# Patient Record
Sex: FEMALE | Race: WHITE
[De-identification: names, ages, dates, MRNs, and addresses within clinical notes are randomized per-mention and may not be internally consistent; named-entity substitution may affect disease eponyms.]

---

## 2017-10-20 ENCOUNTER — HOSPITAL ENCOUNTER (OUTPATIENT)
Dept: HOSPITAL 47 - RADMAMWWP | Age: 62
Discharge: HOME | End: 2017-10-20
Payer: COMMERCIAL

## 2017-10-20 DIAGNOSIS — Z12.31: Primary | ICD-10-CM

## 2017-10-23 NOTE — MM
Reason for exam: screening  (asymptomatic).

Last mammogram was performed 1 year and 1 month ago.



History:

Patient is postmenopausal and is nulliparous.

Family history of breast cancer in maternal aunt.

Benign cyst aspiration of the right breast, 2003.



Physical Findings:

A clinical breast exam by your physician is recommended on an annual basis and 

results should be correlated with mammographic findings.



MG Screening Mammo w CAD

Bilateral CC and MLO view(s) were taken.

Prior study comparison: September 29, 2016, bilateral MG screening mammo w CAD.  

November 12, 2014, bilateral MG screening mammo w CAD.

The breast tissue is extremely dense which could obscure a lesion on mammography. 

No suspicious abnormality on the right breast. Architectural distortion lower 

inner quadrant at middle depth.



These results were verbally communicated with the patient and result sheet given 

to the patient on 10/20/17.





ASSESSMENT: Incomplete: need additional imaging evaluation, BI-RAD 0



RECOMMENDATION:

Special view mammogram and ultrasound of the left breast.



[facility] will attempt to contact patient to return for supplemental 

views and ultrasound.

## 2017-12-15 ENCOUNTER — HOSPITAL ENCOUNTER (OUTPATIENT)
Dept: HOSPITAL 47 - LABPAT | Age: 62
Discharge: HOME | End: 2017-12-15
Payer: COMMERCIAL

## 2017-12-15 DIAGNOSIS — M23.92: ICD-10-CM

## 2017-12-15 DIAGNOSIS — Z01.812: Primary | ICD-10-CM

## 2017-12-15 LAB
ANION GAP SERPL CALC-SCNC: 5 MMOL/L
BASOPHILS # BLD AUTO: 0.1 K/UL (ref 0–0.2)
BASOPHILS NFR BLD AUTO: 1 %
CH: 30.1
CHCM: 31.6
CHLORIDE SERPL-SCNC: 104 MMOL/L (ref 98–107)
CO2 SERPL-SCNC: 31 MMOL/L (ref 22–30)
EOSINOPHIL # BLD AUTO: 0.2 K/UL (ref 0–0.7)
EOSINOPHIL NFR BLD AUTO: 2 %
ERYTHROCYTE [DISTWIDTH] IN BLOOD BY AUTOMATED COUNT: 4.76 M/UL (ref 3.8–5.4)
ERYTHROCYTE [DISTWIDTH] IN BLOOD: 14.2 % (ref 11.5–15.5)
HCT VFR BLD AUTO: 45.6 % (ref 34–46)
HDW: 2.28
HGB BLD-MCNC: 14.3 GM/DL (ref 11.4–16)
LUC NFR BLD AUTO: 1 %
LYMPHOCYTES # SPEC AUTO: 1.9 K/UL (ref 1–4.8)
LYMPHOCYTES NFR SPEC AUTO: 28 %
MCH RBC QN AUTO: 30 PG (ref 25–35)
MCHC RBC AUTO-ENTMCNC: 31.3 G/DL (ref 31–37)
MCV RBC AUTO: 95.8 FL (ref 80–100)
MONOCYTES # BLD AUTO: 0.6 K/UL (ref 0–1)
MONOCYTES NFR BLD AUTO: 8 %
NEUTROPHILS # BLD AUTO: 4.2 K/UL (ref 1.3–7.7)
NEUTROPHILS NFR BLD AUTO: 61 %
POTASSIUM SERPL-SCNC: 5.3 MMOL/L (ref 3.5–5.1)
SODIUM SERPL-SCNC: 140 MMOL/L (ref 137–145)
WBC # BLD AUTO: 0.08 10*3/UL
WBC # BLD AUTO: 7 K/UL (ref 3.8–10.6)
WBC (PEROX): 7.07

## 2017-12-15 PROCEDURE — 93005 ELECTROCARDIOGRAM TRACING: CPT

## 2017-12-15 PROCEDURE — 36415 COLL VENOUS BLD VENIPUNCTURE: CPT

## 2017-12-15 PROCEDURE — 85025 COMPLETE CBC W/AUTO DIFF WBC: CPT

## 2017-12-15 PROCEDURE — 80051 ELECTROLYTE PANEL: CPT

## 2017-12-26 NOTE — HP
HISTORY AND PHYSICAL



CHIEF COMPLAINT:

Left knee pain.



HISTORY OF PRESENT ILLNESS:

The patient is a 62-year-old  who presents with progressive left knee

pain and mechanical symptoms after previous twisting injury.  She has tried medications

in addition to an injection with continued limitation of her activities because of

pain.  She has been unable to work recently.



PAST MEDICAL HISTORY:

Significant for hypothyroidism and osteoporosis along with depression.



CURRENT MEDICATIONS:

Adderall, Fosamax, Protonix, Synthroid, Wellbutrin, and Zyrtec.



PAST SURGICAL HISTORY:

Significant for previous left knee arthroscopy.



FAMILY HISTORY:

Significant for cancer.



SOCIAL HISTORY:

Significant for social alcohol use.



REVIEW OF SYSTEMS:

Sixteen point review of systems otherwise reviewed and is noncontributory.



PHYSICAL EXAMINATION:

On examination, the patient is approximately 5 foot 5, 140 pounds, of mesomorphic

habitus.  HEENT exam is nonfocal.  NECK:  Supple. She has painless passive motion of

her left hip.  Straight leg raise is negative.  Active motion left knee -10 to 125

degrees of flexion.  She has a mild effusion.  She is tender about the medial and

lateral joint line.  Active motion -10 to 125.  Collaterals are stable, Lachman's

negative, Chary's elicits medial and lateral pain.  Her distal neurovascular exam

appears to be intact in the left lower extremity.



MRI report for the left knee from 12/11/2017 shows a posterior medial meniscal tear

along with an anterior lateral meniscal tear.



IMPRESSION:

Left knee internal derangement with symptomatic medial lateral meniscal tears.



RECOMMENDATIONS:

I talked to the patient at length regarding treatment options.  At this point, she is

having significant pain and mechanical symptoms despite conservative treatment.  After

thorough discussion, she opts to proceed with surgery.  We will plan to proceed with

arthroscopic evaluation with possible partial medial and lateral meniscectomy.  We will

likely perform that as an outpatient procedure.





MMODL / IJN: [id number] / Job#: [id number]

## 2017-12-27 ENCOUNTER — HOSPITAL ENCOUNTER (OUTPATIENT)
Dept: HOSPITAL 47 - OR | Age: 62
Discharge: HOME | End: 2017-12-27
Payer: COMMERCIAL

## 2017-12-27 VITALS — TEMPERATURE: 97.7 F

## 2017-12-27 VITALS — DIASTOLIC BLOOD PRESSURE: 83 MMHG | HEART RATE: 60 BPM | SYSTOLIC BLOOD PRESSURE: 157 MMHG

## 2017-12-27 VITALS — RESPIRATION RATE: 18 BRPM

## 2017-12-27 VITALS — BODY MASS INDEX: 22.9 KG/M2

## 2017-12-27 DIAGNOSIS — S83.242A: Primary | ICD-10-CM

## 2017-12-27 DIAGNOSIS — M17.12: ICD-10-CM

## 2017-12-27 DIAGNOSIS — F32.9: ICD-10-CM

## 2017-12-27 DIAGNOSIS — M81.0: ICD-10-CM

## 2017-12-27 DIAGNOSIS — S83.272A: ICD-10-CM

## 2017-12-27 DIAGNOSIS — E03.9: ICD-10-CM

## 2017-12-27 DIAGNOSIS — Z79.1: ICD-10-CM
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
## 2017-12-27 DIAGNOSIS — M65.862: ICD-10-CM

## 2017-12-27 DIAGNOSIS — Z79.899: ICD-10-CM

## 2017-12-27 PROCEDURE — 29880 ARTHRS KNE SRG MNISECTMY M&L: CPT

## 2017-12-27 PROCEDURE — 84132 ASSAY OF SERUM POTASSIUM: CPT

## 2017-12-27 RX ADMIN — HYDROMORPHONE HYDROCHLORIDE PRN MG: 1 INJECTION, SOLUTION INTRAMUSCULAR; INTRAVENOUS; SUBCUTANEOUS at 13:50

## 2017-12-27 RX ADMIN — HYDROMORPHONE HYDROCHLORIDE PRN MG: 1 INJECTION, SOLUTION INTRAMUSCULAR; INTRAVENOUS; SUBCUTANEOUS at 13:57

## 2017-12-27 NOTE — P.OP
Date of Procedure: 12/27/17


Preoperative Diagnosis: 


Left knee internal derangement


Postoperative Diagnosis: 


Left knee posterior medial meniscal tear/grade 3 chondral injury medial femoral 

condyle/anterior lateral meniscal tear/reactive synovitis


Procedure(s) Performed: 


Left knee arthroscopic partial medial meniscectomy/medial femoral chondrectomy/

partial lateral meniscectomy/partial synovectomy of the medial and lateral 

compartments


Anesthesia: GWEN


Surgeon: Julián Meeks


Estimated Blood Loss (ml): 10


Pathology: none sent


Condition: stable


Disposition: PACU


Indications for Procedure: 


The patient's a 62-year-old female who presents with progressive left knee pain 

and mechanical symptoms after a previous twisting injury.  She tried 

conservative measures with persistence of her symptoms.  A discussion of the 

risks and benefits of continued conservative measures versus operative 

intervention was made with patient.  She opted to proceed with surgery.  

Operative risks to include infection, neurovascular injury, development of 

blood clots, possible incomplete resolution of symptoms, possible worsening of 

symptoms and need for subsequent procedures was discussed.  Informed consent 

was obtained.


Operative Findings: 


As below


Description of Procedure: 


The patient was brought to the operating room, and after induction of general 

anesthesia examined left knee.  Collaterals were stable, Lachman was negative, 

and posterior drawer was negative.  The left lower extremity was prepped and 

draped in a normal fashion.  A superior lateral portal was made through a 3 mm 

skin incision superior and lateral to the patella.  This was used for outflow.  

A lateral portal was made through a 5 mm vertical skin incision lateral to the 

patellar tendon above the joint line.  Diagnostic arthroscopy was performed.  A 

medial portal was made through a similar incision medial to the patellar tendon 

above the joint line.  On inspection of the medial compartment she is noted to 

have a longitudinal tear involving the posterior horn of the medial meniscus in 

the white-white junction.  This debrided back to stable base with straight 

baskets and a motorized shaver.  The edges were contoured.  A corresponding 

grade 3 chondral injury was noted involving the distal medial portion of the 

medial femoral condyle.  There was a loose chondral flap that was debrided back 

to stable base with a motorized shaver.  Reactive synovitis involving the 

anterior medial and lateral compartments was debrided with a motorized shaver.  

On inspection of the notch, the anterior cruciate ligament appeared to be 

intact.  On inspection the lateral compartment, a complex tear involving the 

anterior horn of the lateral meniscus in the white-junction was noted.  This 

was debrided back to stable base with a motorized shaver.  A degenerative type 

tear was noted involving the middle one third of the lateral meniscus in the 

white-junction.  This was debrided back to stable base with straight baskets 

and a motorized shaver.  The posterior horn was intact.  On inspection 

patellofemoral articular lesion, minimal degenerative changes were noted.  The 

gutters were clear debris.  The knee was then thoroughly irrigated.  The 

portals were closed with Steri-Strips.  A sterile dressing was applied in 

addition to a compression stocking.  The patient was awoken from general 

anesthesia and transferred to recovery room in good condition.  Blood loss was 

estimated at 10 mL.  No complications were incurred.

## 2018-03-30 ENCOUNTER — HOSPITAL ENCOUNTER (OUTPATIENT)
Dept: HOSPITAL 47 - RADBDWWP | Age: 63
Discharge: HOME | End: 2018-03-30
Payer: COMMERCIAL

## 2018-03-30 DIAGNOSIS — Z13.820: Primary | ICD-10-CM

## 2018-03-30 PROCEDURE — 77080 DXA BONE DENSITY AXIAL: CPT

## 2018-03-30 NOTE — BD
EXAMINATION TYPE: MG DEXA axial skeleton.  

 

DATE OF EXAM: 3/30/2018

 

COMPARISON: NONE

 

CLINICAL HISTORY:

 

Height:  64

Weight:  141.9

 

FRAX RISK QUESTIONS:

Alcohol (3 or more units per day):  no

Family History (Parent hip fracture):  no

Glucocorticoids (More than 3mos):  no

           (Ex: prednisone, prednisolone, methylprednisolone, dexamethasone, and hydrocortisone).    
     

History of Fracture in Adulthood: yes

Secondary Osteoporosis:

  1.  Type 1 Diabetes: no

  2.  Hyperthyroidism: no

  3.  Menopause before 45: no

  4.  Malnutrition: no

  5.  Chronic liver disease: no

Rheumatoid Arthritis: no

Current Tobacco Use: no

 

RISK FACTORS 

HISTORY OF: 

Family History of Osteoporosis: no

Active: yes

Diet low in dairy products/other sources of calcium:  no

Postmenopausal woman: around age 57

Lost more than 2 inches in height since high school: no

Frequent falls: no

Adrenal Insufficiency: no

 

MEDICATIONS: wellbutrin, aderol, motrin, allergy meds. protonix

th yroid Medications:  levothyroxine, 

How Long: 10 years

Osteoporosis Medications: fosamax

How Lon years

 

 

 

Additional History:

 

 

EXAM MEASUREMENTS: 

Bone mineral densitometry was performed using the [employer] System.

Bone mineral density as measured about the Lumbar spine is:  

----- L1-L4(G/cm2): 1.092

T Score Values are as follows:

----- L2: -0.5

----- L3: -0.5

----- L4: -0.7

----- L1-L4: -0.7

Bone mineral density has: increased 4.8 % since study of: 2016

 

Bone mineral density about the R hip (g/cm2): 0.840

Bone mineral density about the L hip (g/cm2): 0.796

T Score values are as follows:

-----R Neck: -1.6

-----L Neck: -1.7

-----R Total: -0.6

-----L Total: -1.0

Bone mineral density has: % since study of: 2016

 

 

IMPRESSION:

no osteoporosis or osteopenia

 

 

 

 

 

NOTE:  T-SCORE=SD OF THE YOUNG ADULT MEAN.

## 2019-04-18 ENCOUNTER — HOSPITAL ENCOUNTER (OUTPATIENT)
Dept: HOSPITAL 47 - RADMAMWWP | Age: 64
End: 2019-04-18
Attending: FAMILY MEDICINE
Payer: COMMERCIAL

## 2019-04-18 DIAGNOSIS — Z12.31: Primary | ICD-10-CM

## 2019-04-18 PROCEDURE — 77067 SCR MAMMO BI INCL CAD: CPT

## 2019-04-18 PROCEDURE — 77063 BREAST TOMOSYNTHESIS BI: CPT

## 2019-04-19 NOTE — MM
Reason for exam: screening  (asymptomatic).

Last mammogram was performed 1 year and 6 months ago.



History:

Patient is postmenopausal and is nulliparous.

Family history of breast cancer in maternal aunt.

US discontinued breast core LT of the left breast, November 3, 2017.  Benign cyst 

aspiration of the right breast, 2003.



Physical Findings:

A clinical breast exam by your physician is recommended on an annual basis and 

results should be correlated with mammographic findings.



MG 3D Screening Mammo W/Cad

Bilateral CC and MLO view(s) were taken.

Prior study comparison: October 26, 2017, left breast MG work up mamm w CAD LT.  

October 20, 2017, bilateral MG screening mammo w CAD.

The breast tissue is extremely dense which could obscure a lesion on mammography. 

Benign appearing bilateral calcifications. No suspicious abnormality.  No 

significant changes when compared with prior studies.





ASSESSMENT: Benign, BI-RAD 2



RECOMMENDATION:

Routine screening mammogram of both breasts in 1 year.

## 2020-11-17 ENCOUNTER — HOSPITAL ENCOUNTER (OUTPATIENT)
Dept: HOSPITAL 47 - RADMAMWWP | Age: 65
Discharge: HOME | End: 2020-11-17
Attending: OBSTETRICS & GYNECOLOGY
Payer: COMMERCIAL

## 2020-11-17 DIAGNOSIS — Z12.31: Primary | ICD-10-CM

## 2020-11-17 PROCEDURE — 77067 SCR MAMMO BI INCL CAD: CPT

## 2020-11-18 NOTE — MM
Reason for exam: screening  (asymptomatic).

Last mammogram was performed 1 year and 7 months ago.



History:

Patient is postmenopausal and is nulliparous.

Family history of breast cancer in maternal aunt.

US discontinued breast core LT of the left breast, November 3, 2017.  Benign cyst 

aspiration of the right breast, 2003.



Physical Findings:

A clinical breast exam by your physician is recommended on an annual basis and 

results should be correlated with mammographic findings.



MG Screening Mammo w CAD

Bilateral CC and MLO view(s) were taken.

Prior study comparison: April 18, 2019, bilateral MG 3d screening mammo w/cad.  

October 26, 2017, left breast MG work up mamm w CAD LT.

The breast tissue is extremely dense which could obscure a lesion on mammography. 

Stable benign calcifications. There is no discrete abnormality.  No significant 

changes when compared with prior studies.





ASSESSMENT: Benign, BI-RAD 2



RECOMMENDATION:

Routine screening mammogram of both breasts in 1 year.

## 2021-12-22 ENCOUNTER — HOSPITAL ENCOUNTER (OUTPATIENT)
Dept: HOSPITAL 47 - RADMAMWWP | Age: 66
Discharge: HOME | End: 2021-12-22
Attending: FAMILY MEDICINE
Payer: MEDICARE

## 2021-12-22 DIAGNOSIS — Z78.0: ICD-10-CM

## 2021-12-22 DIAGNOSIS — N60.42: ICD-10-CM

## 2021-12-22 DIAGNOSIS — Z80.3: ICD-10-CM

## 2021-12-22 DIAGNOSIS — N64.89: Primary | ICD-10-CM

## 2021-12-22 DIAGNOSIS — N60.02: ICD-10-CM

## 2021-12-22 PROCEDURE — 76641 ULTRASOUND BREAST COMPLETE: CPT

## 2021-12-22 PROCEDURE — 77062 BREAST TOMOSYNTHESIS BI: CPT

## 2021-12-22 PROCEDURE — 77066 DX MAMMO INCL CAD BI: CPT

## 2021-12-22 NOTE — MM
Reason for exam: clinical finding.

Last mammogram was performed 1 year and 1 month ago.



History:

Patient is postmenopausal and is nulliparous.

Family history of breast cancer in maternal aunt.

US discontinued breast core LT of the left breast, November 3, 2017.  Benign cyst 

aspiration of the right breast, 2003.



Physical Findings:

Nurse did not find any significant physical abnormalities on exam.



MG 3D Diag Mammo W/Cad ADAM

Bilateral CC, MLO, and XCCL view(s) were taken.

Prior study comparison: November 17, 2020, bilateral MG screening mammo w CAD.  

April 18, 2019, bilateral MG 3d screening mammo w/cad.

The breast tissue is heterogeneously dense. This may lower the sensitivity of 

mammography.  Benign oil cyst calcifications. Left medial asymmetric density does 

not persist on XCCL view. Stable asymmetric density posterior superior right 

breast.



These results were verbally communicated with the patient and result sheet given 

to the patient on 12/22/21.





ASSESSMENT: Incomplete: need additional imaging evaluation, BI-RAD 0



RECOMMENDATION:

Ultrasound of the left breast.

(for pain)

## 2021-12-22 NOTE — USB
Reason for exam: additional evaluation requested from abnormal screening.



History:

Patient is postmenopausal and is nulliparous.

Family history of breast cancer in maternal aunt.

US discontinued breast core LT of the left breast, November 3, 2017.  Benign cyst 

aspiration of the right breast, 2003.



US Breast LT

Left complete breast ultrasound includes all four quadrants, the retroareolar 

region and axilla. Finding demonstrates a 0.3 x 0.2 x 0.4cm cystic, benign lesion 

at 3 o'clock, mild duct ectasia.



These results were verbally communicated with the patient and result sheet given 

to the patient on 12/22/21.





ASSESSMENT: Benign, BI-RAD 2



RECOMMENDATION:

Routine screening mammogram of both breasts in 1 year.

Manage on a clinical basis with regard to left pain.

## 2022-02-09 ENCOUNTER — HOSPITAL ENCOUNTER (OUTPATIENT)
Dept: HOSPITAL 47 - RADBDWWP | Age: 67
Discharge: HOME | End: 2022-02-09
Attending: FAMILY MEDICINE
Payer: MEDICARE

## 2022-02-09 DIAGNOSIS — M85.88: Primary | ICD-10-CM

## 2022-02-09 PROCEDURE — 77080 DXA BONE DENSITY AXIAL: CPT

## 2022-02-09 NOTE — BD
EXAMINATION TYPE: Axial Bone Density

 

DATE OF EXAM: 2022

 

COMPARISON: NONE

 

CLINICAL HISTORY:

 

Height:  64

Weight:  138.9

 

FRAX RISK QUESTIONS:

Alcohol (3 or more units per day):  no

Family History (Parent hip fracture):  no

Glucocorticoids (More than 3mos):  no

           (Ex: prednisone, prednisolone, methylprednisolone, dexamethasone, and hydrocortisone).    
     

History of Fracture in Adulthood: yes

Secondary Osteoporosis:

  1.  Type 1 Diabetes: no

  2.  Hyperthyroidism: no

  3.  Menopause before 45: no

  4.  Malnutrition: no

  5.  Chronic liver disease: no

Rheumatoid Arthritis: no

Current Tobacco Use: no

 

RISK FACTORS 

HISTORY OF: 

History of Wrist Fracture: right

When: 10 years

Surgery to Spine/Hip(right/left)/Wrist (right/left): rt wrist

When: 10 years ago

Family History of Osteoporosis: no

Active: no

Diet low in dairy products/other sources of calcium:  yes

Postmenopausal woman: yes

Lost more than 2 inches in height since high school: yes

 

MEDICATIONS: protonix, anti-depressant, blood pressure

Thyroid Medications:  levothyroxine

How Lon years

 

 

Additional History:

 

 

EXAM MEASUREMENTS: 

Bone mineral densitometry was performed using the [employer].

Bone mineral density as measured about the Lumbar spine is:  

----- L1-L4(G/cm2): 1.151

T Score Values are as follows:

----- L2: -0.2

----- L3: -0.8

----- L4: -0.8

----- L1-L4: -0.2

Bone mineral density has: decreased -0.2 % since study of: 3.30.2018

 

Bone mineral density about the R hip (g/cm2): 0.822

Bone mineral density about the L hip (g/cm2): 0.737

T Score values are as follows:

-----R Neck: -1.6

-----L Neck: -2.2

-----R Total: -1.0

-----L Total: -1.7

Bone mineral density has: decreased -7.2 % since study of: 3.30.2018

 

 

IMPRESSION:

Osteopenia of the bilateral femora.

 

NOTE:  T-SCORE=SD OF THE YOUNG ADULT MEAN.

## 2022-03-04 ENCOUNTER — HOSPITAL ENCOUNTER (OUTPATIENT)
Dept: HOSPITAL 47 - ORWHC2ENDO | Age: 67
Discharge: HOME | End: 2022-03-04
Attending: INTERNAL MEDICINE
Payer: MEDICARE

## 2022-03-04 VITALS — TEMPERATURE: 98.2 F

## 2022-03-04 VITALS — HEART RATE: 70 BPM | SYSTOLIC BLOOD PRESSURE: 120 MMHG | RESPIRATION RATE: 20 BRPM | DIASTOLIC BLOOD PRESSURE: 70 MMHG

## 2022-03-04 DIAGNOSIS — K57.30: Primary | ICD-10-CM

## 2022-03-04 DIAGNOSIS — K21.9: ICD-10-CM

## 2022-03-04 DIAGNOSIS — I10: ICD-10-CM

## 2022-03-04 DIAGNOSIS — K64.8: ICD-10-CM

## 2022-03-04 DIAGNOSIS — E07.9: ICD-10-CM

## 2022-03-04 PROCEDURE — 45378 DIAGNOSTIC COLONOSCOPY: CPT

## 2022-03-04 NOTE — P.PCN
Date of Procedure: 03/04/22


Procedure(s) Performed: 


BRIEF HISTORY: Patient is a 67-year-old pleasant female scheduled for an 

elective colonoscopy as a part of evaluation of change in bowel habits.





PROCEDURE PERFORMED: Colonoscopy. 





PREOPERATIVE DIAGNOSIS: Change in bowel habits. 





IV sedation per Anesthesia. 





PROCEDURE: After informed consent was obtained, the patient, was brought into 

the endoscopy unit. IV sedation was administered by Anesthesia under continuous 

monitoring.  Digital rectal examination was normal. Initially the Olympus CF-160

flexible video colonoscope was then inserted in the rectum, gradually advanced 

into the cecum without any difficulty. Careful examination was performed as the 

scope was gradually being withdrawn. Ileocecal valve and the appendiceal orifice

were visualized and appeared normal.  Prep was excellent. Mucosa of the cecum, 

ascending colon, transverse colon, descending colon, sigmoid colon, and rectum 

appeared normal.  At her sigmoid antibiosis seen.  Retroflexion was performed in

the rectum and small internal hemorrhoids were seen. The patient tolerated the 

procedure well. 





IMPRESSION: 


Normal-appearing colon from rectum to cecum .  No evidence of colorectal 

neoplasia


Scattered sigmoid diverticulosis


Small internal hemorrhoids





RECOMMENDATIONS:  Findings of this examination were discussed with the patient 

is a family.  She was advised to have a repeat screening colonoscopy in 10 

years.

## 2022-04-19 ENCOUNTER — HOSPITAL ENCOUNTER (OUTPATIENT)
Dept: HOSPITAL 47 - WWCWWP | Age: 67
End: 2022-04-19
Attending: OBSTETRICS & GYNECOLOGY
Payer: MEDICARE

## 2022-04-19 VITALS
TEMPERATURE: 98.7 F | HEART RATE: 79 BPM | SYSTOLIC BLOOD PRESSURE: 167 MMHG | DIASTOLIC BLOOD PRESSURE: 94 MMHG | RESPIRATION RATE: 16 BRPM

## 2022-04-19 DIAGNOSIS — Z01.419: Primary | ICD-10-CM

## 2022-04-19 DIAGNOSIS — Z87.39: ICD-10-CM

## 2022-04-19 DIAGNOSIS — M19.90: ICD-10-CM

## 2022-04-19 DIAGNOSIS — F32.A: ICD-10-CM

## 2022-04-19 DIAGNOSIS — I10: ICD-10-CM

## 2022-04-19 DIAGNOSIS — Z79.890: ICD-10-CM

## 2022-04-19 DIAGNOSIS — E03.9: ICD-10-CM

## 2022-04-19 DIAGNOSIS — M25.552: ICD-10-CM

## 2022-04-19 DIAGNOSIS — K21.9: ICD-10-CM

## 2022-04-19 DIAGNOSIS — M79.605: ICD-10-CM

## 2022-04-19 DIAGNOSIS — Z79.899: ICD-10-CM

## 2022-04-19 NOTE — P.HPOB
History of Present Illness


H&P Date: 04/19/22


Chief Complaint: The patient is here for her routine gynecologic exam.





This is a 67-year-old G0 with an LMP of 2006.  The patient is here to establish 

with this office.  It has been about 2 years since her last pelvic exam.  She 

previously saw Dr. Nguyen for her gynecologic care. During the past year, she 

has been experiencing some left-sided pains ranging from her breast down to her 

left leg.  She has been experiencing some left pelvic discomfort that seems to 

affect her left hip and left upper leg.  She describes it as an achy discomfort.

 Because of the left-sided pains she has had a mammogram and left side workup 

which was benign in December 2021.  She also had a colonoscopy done which showed

diverticulosis per the patient.  She is otherwise without gynecologic 

complaints.





Review of Systems





She has gained about 10 pounds over the past year.  She denies respiratory or 

cardiac problems.  GI: She has had issues with constipation.





Past Medical History


Past Medical History: GERD/Reflux, Hypertension, Osteoarthritis (OA), Thyroid 

Disorder


Additional Past Medical History / Comment(s): Hypothyroidism and seasonal 

ALLERGIES.  History of osteopenia(may have taken Fosomax for uncertain amount of

time). PAST GYN HISTORY: She has no history of STDs.


History of Any Multi-Drug Resistant Organisms: MRSA


Date of last positivie culture/infection: STATES HAD BOIL THIGH OVER A YEAR AGO 

IT TESTED POITIVE FOR MRSA


MDRO Source:: THIGH.  2014


Past Surgical History: Orthopedic Surgery


Additional Past Surgical History / Comment(s): L KNEE ARTHROSCOPY,LEFT WRIST 

ORIF,BENIGN PITUITARY TUMOR REMOVED, VOCAL CHORD NODULES, RIGHT KNEE 

ARTHROSCOPIC, SINUS PROCEDURE


Past Anesthesia/Blood Transfusion Reactions: No Reported Reaction


Past Psychological History: ADD/ADHD, Depression (No current symptoms of de

pression on medication.)


Smoking Status: Never smoker


Past Alcohol Use History: Rare (0-3 per month)


Past Drug Use History: Marijuana (Infrequent use.)


Additional History: She is  and has not seen anybody at this time.  She 

is retired.





- Past Family History


  ** Mother


Family Medical History: Congestive Heart Failure (CHF)


Additional Family Medical History / Comment(s): Maternal aunt had breast cancer.





  ** Father


Family Medical History: Cancer


Additional Family Medical History / Comment(s): Liver cirrhosis.  Alcohol abuse.

 Lung cancer.





  ** Brother(s)


Additional Family Medical History / Comment(s): History of alcohol abuse.





Medications and Allergies


                                Home Medications











 Medication  Instructions  Recorded  Confirmed  Type


 


Ibuprofen [Motrin] 600 mg PO Q6H PRN 09/12/14 04/19/22 History


 


Levothyroxine Sodium [Synthroid] 75 mcg PO QAM 09/12/14 04/19/22 History


 


Pantoprazole Sodium 40 mg PO QAM 09/12/14 04/19/22 History


 


buPROPion HCL [Wellbutrin XL] 300 mg PO QAM 09/12/14 04/19/22 History


 


Montelukast [Singulair] 10 mg PO QAM 10/27/17 04/19/22 History


 


Lisinopril [Prinivil] 10 mg PO QAM 03/03/22 04/19/22 History








                                    Allergies











Allergy/AdvReac Type Severity Reaction Status Date / Time


 


No Known Allergies Allergy   Verified 04/19/22 08:23














Exam


                                   Vital Signs











  Temp Pulse Resp BP Pulse Ox


 


 04/19/22 08:26  98.7 F  79  16  167/94  99








                                Intake and Output











 04/18/22 04/19/22 04/19/22





 22:59 06:59 14:59


 


Other:   


 


  Weight   65.317 kg














Height 5 feet 4 inches, weight 144 pounds, BMI 24.7.





This is a well-developed well-nourished white female who is alert and oriented t

imes 3 in no acute distress.


HEENT: Within normal limits.


NECK: Supple without mass or thyromegaly.


CHEST AND LUNGS: Clear to auscultation.


HEART: Regular rate and rhythm.


BREASTS: Are without mass or discharge.


AXILLARY EXAM: Negative for adenopathy.


BACK: Negative for CVA tenderness.


ABDOMEN: Soft, nontender, without palpable masses.


PELVIC EXAM: Normal external genitalia with mild atrophy. Cervix and vagina 

appear normal with mild atrophy.  The cervix is somewhat stenotic secondary to 

atrophy.  There is no unusual discharge.  There is no evidence of prolapse.  The

 uterus is midposition, nongravid size and nontender. There are no palpable 

adnexal masses or tenderness.


RECTAL EXAM: Rectovaginal exam is negative for mass or tenderness and is 

negative for occult blood.


EXTREMITIES: Nontender.





IMPRESSION: 


1.  67-year-old menopausal female with normal gynecologic exam.


2.  Intermittent left sided pains with some symptoms involving the pelvic area, 

left hip and left leg.  No significant physical pelvic findings at this time.  I

 doubt the left-sided pain is gynecologic in nature.


3.  History of osteopenia.  Possible Fosamax use for an uncertain duration.


4.  Elevated blood pressure with a history of chronic hypertension.





PLAN: 


1.  Pap smear was performed.  I will also obtain records from Dr. Nguyen's 

office for the last 10 years of Pap smears.  After these are reviewed, we will 

consider discontinuing Pap smears.


2.  Self breast awareness was discussed with the patient.  We have also 

discussed symptoms associated with inflammatory breast cancer.


3.  Diagnostic mammogram with left breast ultrasound done on 12/22/2021 was 

benign.  Screening mammogram in 1 year was recommended.


4.  Pelvic ultrasound is recommended and the order slip was given to the patient

 for this.  If this is unremarkable, a non-gynecologic cause would be most 

likely for her left-sided pains.


5.  We have discussed her elevated blood pressure.  She states she has been 

taking some type of medication or supplement to help with low energy and for 

weight loss.  This may have an effect on her blood pressure have recommended 

that she discontinue using this.  I have recommended that she check her own blo

od pressure on a regular basis since she does have a blood pressure cuff.  I 

recommended that she follow up with Dr. Cordero for blood pressure elevations.


6.Osteoporosis prevention was discussed.  I have stressed the importance of 

adequate calcium, vitamin D and regular exercise.  Recommended amounts of 

calcium and vitamin D were also discussed.  She had a recent bone density test 

done on 02/09/2022 showed osteopenia.  We will plan on repeating this after 

about 2 years.


7.  She has not received a Covid vaccination and has not had Covid.  She 

understands the CDC recommends Covid vaccination.  She will consider this.


8. She was advised to return in one year for her annual well woman exam and as 

needed.

## 2022-05-03 ENCOUNTER — HOSPITAL ENCOUNTER (OUTPATIENT)
Dept: HOSPITAL 47 - RADUSWWP | Age: 67
Discharge: HOME | End: 2022-05-03
Attending: OBSTETRICS & GYNECOLOGY
Payer: MEDICARE

## 2022-05-03 DIAGNOSIS — R10.2: Primary | ICD-10-CM

## 2022-05-03 DIAGNOSIS — R14.3: ICD-10-CM

## 2022-05-03 PROCEDURE — 76830 TRANSVAGINAL US NON-OB: CPT

## 2022-05-03 PROCEDURE — 76856 US EXAM PELVIC COMPLETE: CPT

## 2022-05-04 NOTE — P.PN
Progress Note - Text


Progress Note Date: 05/04/22





OUTPATIENT FOLLOW-UP NOTE





TEST(S)/RESULTS: Pelvic ultrasound done on 05/03/2022 showed no evidence of 

pelvic or adnexal masses.  The endometrial thickness was normal at 0.2 cm.  A 

small amount of fluid was noted within the endometrial cavity.


METHOD OF NOTIFICATION: The patient was notified by phone.


PATIENT COMMENTS: The patient denies any postmenopausal bleeding.


DIAGNOSIS: Small endometrial fluid within the endometrial cavity with no 

evidence of endometrial thickening.  And no evidence of adnexal masses.


DISCUSSION: We have discussed how her left-sided body pain including hip and 

pelvic discomfort are probably not gynecologic in nature.  She can discuss other

types of workup with her primary care physician for the left-sided body pain.


PLAN: Repeat pelvic ultrasound in one year. She was advised to return in one 

year for her annual well woman exam.
negative history

## 2022-05-04 NOTE — US
EXAMINATION TYPE: US pelvis complete transvag

 

DATE OF EXAM: 5/3/2022

 

COMPARISON: NONE

 

CLINICAL HISTORY: R10.2 Pelvic pain. left pelvic pain

 

TECHNIQUE:  Transvaginal (TV) and Transabdominal (TA) .  Transabdominal sonographic images of the pel
vis were acquired.  Transvaginal sonographic images were medically necessary to better assess the fol
lowing anatomy: ovaries 

 

Date of LMP:  unknown 

 

EXAM MEASUREMENTS:

 

Uterus:  5.1 x 1.7 x 3.7 cm

Endometrial Stripe: 0.2 cm

Right Ovary:  unable to visualize 

Left Ovary:  unable to visualize 

 

 

 

1. Uterus:  Anteverted  

2. Endometrium:  fluid within

3. Right Ovary:  Obscured by overlying bowel gas

4. Left Ovary:  Obscured by overlying bowel gas

5. Bilateral Adnexa:  large amount of peristalsing bowel

6. Posterior cul-de-sac:  wnl

 

Urinary bladder is sonolucent. The posterior wall is normal.

 

IMPRESSION: 

1. Fluid within the endometrial canal.

2. Visualized pelvic ultrasound is otherwise unremarkable. Adnexal portions limited due to bowel gas.

## 2023-04-25 ENCOUNTER — HOSPITAL ENCOUNTER (OUTPATIENT)
Dept: HOSPITAL 47 - WWCWWP | Age: 68
End: 2023-04-25
Attending: OBSTETRICS & GYNECOLOGY
Payer: MEDICARE

## 2023-04-25 VITALS
TEMPERATURE: 98.4 F | RESPIRATION RATE: 17 BRPM | DIASTOLIC BLOOD PRESSURE: 90 MMHG | HEART RATE: 90 BPM | SYSTOLIC BLOOD PRESSURE: 147 MMHG

## 2023-04-25 DIAGNOSIS — K59.00: ICD-10-CM

## 2023-04-25 DIAGNOSIS — Z87.39: ICD-10-CM

## 2023-04-25 DIAGNOSIS — E03.9: ICD-10-CM

## 2023-04-25 DIAGNOSIS — Z80.1: ICD-10-CM

## 2023-04-25 DIAGNOSIS — I10: ICD-10-CM

## 2023-04-25 DIAGNOSIS — M19.90: ICD-10-CM

## 2023-04-25 DIAGNOSIS — N95.8: ICD-10-CM

## 2023-04-25 DIAGNOSIS — Z79.1: ICD-10-CM

## 2023-04-25 DIAGNOSIS — Z82.49: ICD-10-CM

## 2023-04-25 DIAGNOSIS — Z01.419: ICD-10-CM

## 2023-04-25 DIAGNOSIS — M85.80: ICD-10-CM

## 2023-04-25 DIAGNOSIS — Z80.3: ICD-10-CM

## 2023-04-25 DIAGNOSIS — K21.9: ICD-10-CM

## 2023-04-25 DIAGNOSIS — Z12.31: Primary | ICD-10-CM

## 2023-04-25 DIAGNOSIS — Z79.890: ICD-10-CM

## 2023-04-25 PROCEDURE — 77067 SCR MAMMO BI INCL CAD: CPT

## 2023-04-25 PROCEDURE — 77063 BREAST TOMOSYNTHESIS BI: CPT

## 2023-04-25 NOTE — P.HPOB
History of Present Illness


H&P Date: 04/25/23


Chief Complaint: The patient is here for her routine gynecologic exam and ma

mmogram.





This is a 68-year-old G0 with an LMP of 2006.  The patient states she had low 

abdominal pain on 4/22 and 04/23/2023.  She did not go to the emergency room and

states it did resolve.  She has been having issues with her digestive tract 

including constipation.  She knows she does not eat very healthy.  She is 

without gynecologic complaints and denies any postmenopausal bleeding.





The patient had various abdominal pains last year and had a pelvic ultrasound 

which showed a small amount of endometrial fluid and was otherwise unremarkable.





Review of Systems





She has lost about 5 pounds over the past year.  She denies respiratory or 

cardiac problems.  GI: Frequent episodes of constipation.  She states fiber 

seems to make her more constipated.





Past Medical History


Past Medical History: GERD/Reflux, Hypertension, Osteoarthritis (OA), Thyroid 

Disorder


Additional Past Medical History / Comment(s): Hypothyroidism and seasonal 

ALLERGIES.  History of osteopenia(may have taken Fosomax for uncertain amount of

time). PAST GYN HISTORY: She has no history of STDs.


History of Any Multi-Drug Resistant Organisms: MRSA


Date of last positivie culture/infection: STATES HAD BOIL THIGH OVER A YEAR AGO 

IT TESTED POITIVE FOR MRSA


MDRO Source:: THIGH.  2014


Past Surgical History: Orthopedic Surgery


Additional Past Surgical History / Comment(s): L KNEE ARTHROSCOPY,LEFT WRIST 

ORIF,BENIGN PITUITARY TUMOR REMOVED, VOCAL CHORD NODULES, RIGHT KNEE 

ARTHROSCOPIC, SINUS PROCEDURE.  Colonoscopy 2022(next after 10yr).


Past Anesthesia/Blood Transfusion Reactions: No Reported Reaction


Past Psychological History: ADD/ADHD, Depression


Smoking Status: Never smoker


Past Alcohol Use History: Occasional (0-3 per month.)


Past Drug Use History: Marijuana (Infrequent use.)


Additional History: She is  and is currently not sexually active.  She 

is retired.





- Past Family History


  ** Mother


Family Medical History: Congestive Heart Failure (CHF)


Additional Family Medical History / Comment(s): Maternal aunt had breast cancer.





  ** Father


Family Medical History: Cancer


Additional Family Medical History / Comment(s): Liver cirrhosis.  Alcohol abuse.

 Lung cancer.





  ** Brother(s)


Additional Family Medical History / Comment(s): History of alcohol abuse.





Medications and Allergies


                                Home Medications











 Medication  Instructions  Recorded  Confirmed  Type


 


Ibuprofen [Motrin] 600 mg PO Q6H PRN 09/12/14 04/25/23 History


 


Levothyroxine Sodium [Synthroid] 75 mcg PO QAM 09/12/14 04/25/23 History


 


Pantoprazole Sodium 40 mg PO QAM 09/12/14 04/25/23 History


 


buPROPion HCL [Wellbutrin XL] 300 mg PO QAM 09/12/14 04/25/23 History


 


Montelukast [Singulair] 10 mg PO QAM 10/27/17 04/25/23 History


 


lisinopriL [Prinivil] 10 mg PO QAM 03/03/22 04/25/23 History








                                    Allergies











Allergy/AdvReac Type Severity Reaction Status Date / Time


 


No Known Allergies Allergy   Verified 04/25/23 13:51














Exam


                                   Vital Signs











  Temp Pulse Resp BP Pulse Ox


 


 04/25/23 13:53  98.4 F  90  17  147/90  100








                                Intake and Output











 04/24/23 04/25/23 04/25/23





 22:59 06:59 14:59


 


Other:   


 


  Weight   63.049 kg














Height 5 feet 4 inches, weight 139 pounds, BMI 23.9.





This is a well-developed well-nourished white female who is alert and oriented 

times 3 in no acute distress.


HEENT: Within normal limits.


NECK: Supple without mass or thyromegaly.


CHEST AND LUNGS: Clear to auscultation.


HEART: Regular rate and rhythm.


BREASTS: Are without mass or discharge.


AXILLARY EXAM: Negative for adenopathy.


BACK: Negative for CVA tenderness.


ABDOMEN: Soft, nontender, without palpable masses.  The abdomen is nondistended.


PELVIC EXAM: Normal external genitalia with mild atrophy. Cervix and vagina 

appear normal without atrophy. There is no unusual discharge.  There is no cerv

ical motion tenderness.  There is no evidence of prolapse.  The uterus is 

midposition, nongravid size and nontender. There are no palpable adnexal masses 

or tenderness.


RECTAL EXAM: Rectovaginal exam is negative for mass or tenderness and is 

negative for occult blood.


EXTREMITIES: Nontender.





IMPRESSION: 


1.  68-year-old menopausal female with recent low abdominal pain which resolved.

  Gynecologic exam is unremarkable today.


2.  History of osteopenia.


3.  History of small endometrial fluid by ultrasound on 05/03/2022.





PLAN: 


1.  Pap smears have been discontinued.


2.  Self breast awareness was discussed with the patient.  We have also 

discussed symptoms associated with inflammatory breast cancer.


3.  Screening mammogram will be done today.


4.  Pelvic ultrasound was recommended and the order slip was given to the 

patient for this.  We will reevaluate the endometrial fluid.  We will also see 

if there are any gynecologic causes for the low abdominal pain that she 

experienced last weekend.  She understands that if this does not show a 

gynecologic cause the pain is more likely to be from a GI source.  She will 

follow-up with her PCP if she has recurrence of the low abdominal pains.


5.  Osteoporosis prevention was discussed.  I have stressed the importance of 

adequate calcium, vitamin D and regular exercise.  Recommended amounts of 

calcium and vitamin D were also discussed.  We will plan on repeating the bone 

density test in 1 year since her last one was on 02/05/2022.


6. She was advised to return in one year for her annual well woman exam and as 

needed.

## 2023-04-26 NOTE — MM
Reason for Exam: Screening  (asymptomatic). 

Last mammogram was performed 1 year(s) and 4 month(s) ago. 





Patient History: 

Menarche at age 18. Patient has no children. Postmenopausal. 2003, Benign Cyst Aspiration on the

right side. 11/3/2017, US discontinued breast core LT on the left side.

Maternal aunt had breast cancer. 





Risk Values: 

Uma 5 year model risk: 1.7%.

NCI Lifetime model risk: 5.9%.





Prior Study Comparison: 

4/18/2019 Bilateral Screening Mammogram, [facility]. 11/17/2020 Bilateral Screening Mammogram, [facility].

12/22/2021 Bilateral Diagnostic Mammogram, [facility]. 





Tissue Density: 

The breast tissue is heterogeneously dense. This may lower the sensitivity of mammography.





Findings: 

Analyzed By CAD. 

There are benign-appearing round and linear calcifications bilaterally redemonstrated.



There is no suspicious group of microcalcifications or new suspicious mass in either breast. 





Overall Assessment: Benign, BI-RAD 2





Management: 

Screening Mammogram of both breasts in 1 year.

Some advise ultrasound surveillance in patients with background dense tissue. A clinical breast exam

by your physician is recommended on an annual basis and results should be correlated with

mammographic findings.



Electronically signed and approved by: Ricardo Oakes M.D.

## 2023-06-20 ENCOUNTER — HOSPITAL ENCOUNTER (OUTPATIENT)
Dept: HOSPITAL 47 - RADUSWWP | Age: 68
Discharge: HOME | End: 2023-06-20
Attending: OBSTETRICS & GYNECOLOGY
Payer: MEDICARE

## 2023-06-20 DIAGNOSIS — R10.2: Primary | ICD-10-CM

## 2023-06-20 PROCEDURE — 76856 US EXAM PELVIC COMPLETE: CPT

## 2023-06-20 PROCEDURE — 76830 TRANSVAGINAL US NON-OB: CPT

## 2023-06-20 NOTE — US
EXAMINATION TYPE: US pelvis complete transvag

 

DATE OF EXAM: 6/20/2023

 

COMPARISON: 5/3/2022

 

CLINICAL INDICATION: Female, 68 years old with history of R93.8 ENDO FLUID; fluid within [facility] 1 year a
go, no pain, bleeding or symptoms

 

TECHNIQUE:  TA/TV.  Transabdominal sonographic images of the pelvis were acquired.  Transvaginal sono
graphic images were medically necessary to better assess the following anatomy: endometrium

 

Date of LMP:  20  years ago

 

EXAM MEASUREMENTS:

 

Uterus:  4.3 x 2.5 x 1.6 cm

Endometrial Stripe: 0.18 cm

Right Ovary:  not seen 

Left Ovary:  not seen 

 

 

 

1. Uterus:  Anteverted  and otherwise wnl

2. Endometrium:  Thin endometrial stripe.  by 2 mm thick fluid along the uterine cavity.

3. Right Ovary:  not seen due to bowel gas and atrophy

4. Left Ovary:  not seen due to bowel gas and atrophy

5. Bilateral Adnexa:  wnl

6. Posterior cul-de-sac:  wnl

 

 

 

IMPRESSION: 

1. The endometrial stripe remains thin.

2. There continues to be 2 mm thick fluid along the uterine cavity.

3. Unable to visualize either ovary.

## 2023-06-21 NOTE — P.PN
Progress Note - Text


Progress Note Date: 06/21/23





OUTPATIENT FOLLOW-UP NOTE





TEST(S)/RESULTS: Pelvic ultrasound done on 06/20/2023 showed a small amount of 

endometrial fluid measuring approximately 2 mm.  The endometrial thickness she 

remains thin at 2 mm.


METHOD OF NOTIFICATION: The patient was notified by phone on 06/21/2023.


PATIENT COMMENTS: The patient denies any postmenopausal bleeding.


DIAGNOSIS: Small stable endometrial fluid with normal endometrial thickness.


DISCUSSION: We will continue to do yearly pelvic ultrasounds because of the 

endometrial fluid.  She was also instructed to call if she has any 

postmenopausal vaginal bleeding.


PLAN:  She was advised to return in one year for her annual well woman exam.

## 2024-10-01 ENCOUNTER — HOSPITAL ENCOUNTER (OUTPATIENT)
Dept: HOSPITAL 47 - WWCWWP | Age: 69
End: 2024-10-01
Attending: OBSTETRICS & GYNECOLOGY
Payer: MEDICARE

## 2024-10-01 VITALS
TEMPERATURE: 98.4 F | HEART RATE: 86 BPM | SYSTOLIC BLOOD PRESSURE: 180 MMHG | DIASTOLIC BLOOD PRESSURE: 114 MMHG | RESPIRATION RATE: 16 BRPM

## 2024-10-01 DIAGNOSIS — Z12.31: Primary | ICD-10-CM

## 2024-10-01 DIAGNOSIS — R92.8: ICD-10-CM

## 2024-10-01 PROCEDURE — 77063 BREAST TOMOSYNTHESIS BI: CPT

## 2024-10-01 PROCEDURE — 77067 SCR MAMMO BI INCL CAD: CPT

## 2024-10-01 NOTE — P.PN
Progress Note - Text


Progress Note Date: 10/01/24





The patient was scheduled for her annual well woman examination and mammogram.  

Because I was running behind, the patient had her mammogram done first.  When I 

was ready to see the patient, the patient had left.  I left a message on the 

patient's voicemail apologizing for the inconvenience and for running behind and

did not being able to see her before she had left.  I have asked her to call 

back hopefully to reschedule the appointment and see if we can make up for the 

inconvenience.

## 2024-10-02 NOTE — MM
Reason for Exam: Screening  (asymptomatic). 

Last mammogram was performed 1 year(s) and 6 month(s) ago. 





Patient History: 

Menarche at age 18. Patient has no children. Postmenopausal. 2003, Benign Cyst Aspiration on the

right side. 11/3/2017, US discontinued breast core LT on the left side.

Maternal aunt had breast cancer. 





Risk Values: 

Uma 5 year model risk: 1.7%.

NCI Lifetime model risk: 5.6%.





Prior Study Comparison: 

11/17/2020 Bilateral Screening Mammogram, [facility]. 12/22/2021 Bilateral Diagnostic Mammogram, [facility].

4/25/2023 Bilateral MG 3D screening mammo w/cad, [facility]. 





Tissue Density: 

The breasts are heterogeneously dense, which may obscure small masses.





Findings: 

Analyzed By CAD. 

Right breast: There is no suspicious group of microcalcifications or new suspicious mass.

Benign-appearing calcifications right breast.



Left breast: There is no suspicious group of microcalcifications or new suspicious mass.

Benign-appearing calcifications left breast. 





Overall Assessment: Benign, BI-RAD 2





Management: 

Screening Mammogram of both breasts in 1 year.

[facility] will attempt to contact patient to return for supplemental views and

ultrasound if indicated.



Patient should continue monthly self-breast exams.  A clinical breast exam by your physician is

recommended on an annual basis.

This exam should not preclude additional follow-up of suspicious palpable abnormalities.



Note on Uma scores and lifetime risk:

1. A Uma score greater than 3% is considered moderate risk. If this is the case, consider

specialist referral to assess eligibility for a risk reducing agent.

2. If overall lifetime risk for the development of breast cancer is 20% or higher, the patient may

qualify for future screening with alternating mammogram and breast MRI.



[facility], Workstation: [id number], 10/2/2024 11:59 AM.



Electronically signed and approved by: Jonathan Malik DO

## 2024-10-15 ENCOUNTER — HOSPITAL ENCOUNTER (OUTPATIENT)
Dept: HOSPITAL 47 - WWCWWP | Age: 69
End: 2024-10-15
Attending: OBSTETRICS & GYNECOLOGY
Payer: MEDICARE

## 2024-10-15 VITALS
DIASTOLIC BLOOD PRESSURE: 90 MMHG | RESPIRATION RATE: 16 BRPM | SYSTOLIC BLOOD PRESSURE: 144 MMHG | TEMPERATURE: 98.1 F | HEART RATE: 89 BPM

## 2024-10-15 NOTE — P.HPOB
History of Present Illness


H&P Date: 10/15/24


Chief Complaint: The patient is here for her routine gynecologic exam





This is a 69-year-old G0 with an LMP of 2006.  The patient is without 

gynecologic complaints and denies any postmenopausal bleeding.





Review of Systems





She has lost about 7 pounds over the past year.  She thinks she has been gaining

some weight.  She denies respiratory or cardiac problems.  GI: Constipation 

which she has had intermittently for many years.





Past Medical History


Past Medical History: GERD/Reflux, Hypertension, Osteoarthritis (OA), Thyroid 

Disorder


Additional Past Medical History / Comment(s): Hypothyroidism and seasonal 

ALLERGIES.  History of osteopenia(may have taken Fosomax for uncertain amount of

time). PAST GYN HISTORY: She has no history of STDs.


History of Any Multi-Drug Resistant Organisms: MRSA


Date of last positivie culture/infection: STATES HAD BOIL THIGH OVER A YEAR AGO 

IT TESTED POITIVE FOR MRSA


MDRO Source:: THIGH.  2014


Past Surgical History: Orthopedic Surgery


Additional Past Surgical History / Comment(s): L KNEE ARTHROSCOPY,LEFT WRIST 

ORIF,BENIGN PITUITARY TUMOR REMOVED, VOCAL CHORD NODULES, RIGHT KNEE 

ARTHROSCOPIC, SINUS PROCEDURE.  Colonoscopy 2022(next after 10yr).


Past Anesthesia/Blood Transfusion Reactions: No Reported Reaction


Past Psychological History: ADD/ADHD, Depression


Smoking Status: Never smoker


Past Alcohol Use History: Occasional (0-3 drinks per month.)


Past Drug Use History: Marijuana (Denies current use and infrequently used in 

the past.  She has tried marijuana Gummies.)


Additional History: She is  and is currently not sexually active.  She 

is retired.





- Past Family History


  ** Mother


Family Medical History: Congestive Heart Failure (CHF)


Additional Family Medical History / Comment(s): Maternal aunt had breast cancer.





  ** Father


Family Medical History: Cancer


Additional Family Medical History / Comment(s): Liver cirrhosis.  Alcohol abuse.

 Lung cancer.





  ** Brother(s)


Additional Family Medical History / Comment(s): History of alcohol abuse.





Medications and Allergies


                                Home Medications











 Medication  Instructions  Recorded  Confirmed  Type


 


Ibuprofen [Motrin] 600 mg PO Q6H PRN 09/12/14 10/15/24 History


 


Levothyroxine Sodium [Synthroid] 75 mcg PO QAM 09/12/14 10/15/24 History


 


Pantoprazole Sodium 40 mg PO QAM 09/12/14 10/15/24 History


 


buPROPion HCL [Wellbutrin XL] 300 mg PO QAM 09/12/14 10/15/24 History


 


Montelukast [Singulair] 10 mg PO QAM 10/27/17 10/15/24 History


 


lisinopriL [Prinivil] 10 mg PO QAM 03/03/22 10/15/24 History








                                    Allergies











Allergy/AdvReac Type Severity Reaction Status Date / Time


 


No Known Allergies Allergy   Verified 10/15/24 13:57














Exam


                                   Vital Signs











  Temp Pulse Resp BP Pulse Ox


 


 10/15/24 13:57  98.1 F  89  16  144/90  96








                                Intake and Output











 10/14/24 10/15/24 10/15/24





 22:59 06:59 14:59


 


Other:   


 


  Weight   59.874 kg














Height 5 feet 2 inches, weight 132 pounds, BMI 24.1.





This is a well-developed well-nourished white female who is alert and oriented 

times 3 in no acute distress.


HEENT: Within normal limits.


NECK: Supple without mass or thyromegaly.


CHEST AND LUNGS: Clear to auscultation.


HEART: Regular rate and rhythm.


BREASTS: Are without mass or discharge.


AXILLARY EXAM: Negative for adenopathy.


BACK: Negative for CVA tenderness.


ABDOMEN: Soft, nontender, without palpable masses.


PELVIC EXAM: Normal external genitalia with mild to moderate atrophy. Cervix and

 vagina appear normal with mild to moderate atrophy. There is no unusual 

discharge.  There is no evidence of prolapse.  The uterus is midposition, 

nongravid size and nontender. There are no palpable adnexal masses or 

tenderness.


RECTAL EXAM: Rectovaginal exam is negative for mass or tenderness and is 

negative for occult blood.


EXTREMITIES: Nontender.





IMPRESSION: 


1.  69-year-old menopausal female with normal gynecologic exam.


2.  History of osteopenia.


3.  History of small endometrial fluid by ultrasound most recently done on 

6/20/2023.





PLAN: 


1.  Pap smears have been discontinued.


2.  Self breast awareness was discussed with the patient.  We have also 

discussed symptoms associated with inflammatory breast cancer.


3.  Screening mammogram was done on 10/1/2024 and was benign.


4.  Osteoporosis prevention was discussed.  I have stressed the importance of 

adequate calcium, vitamin D and regular exercise.  Recommended amounts of 

calcium and vitamin D were also discussed.  I have recommended repeating the 

bone density test and the order slip was given to the patient for this.


5.  We will plan on repeating the pelvic ultrasound to reevaluate the 

endometrial fluid and endometrial thickness.  The order slip was given to the 

patient for this.


6.  She was advised to return in one year for her annual well woman exam.

## 2025-01-29 ENCOUNTER — HOSPITAL ENCOUNTER (OUTPATIENT)
Dept: HOSPITAL 47 - RADUSWWP | Age: 70
Discharge: HOME | End: 2025-01-29
Attending: OBSTETRICS & GYNECOLOGY
Payer: MEDICARE

## 2025-01-29 DIAGNOSIS — M81.0: ICD-10-CM

## 2025-01-29 DIAGNOSIS — Z78.0: ICD-10-CM

## 2025-01-29 DIAGNOSIS — R93.89: ICD-10-CM

## 2025-01-29 DIAGNOSIS — N94.89: Primary | ICD-10-CM

## 2025-01-29 PROCEDURE — 77080 DXA BONE DENSITY AXIAL: CPT

## 2025-01-29 PROCEDURE — 76830 TRANSVAGINAL US NON-OB: CPT

## 2025-01-29 PROCEDURE — 76856 US EXAM PELVIC COMPLETE: CPT

## 2025-01-29 NOTE — US
EXAMINATION TYPE: US pelvis complete transvag

 

DATE OF EXAM: 1/29/2025

 

COMPARISON: 06/20/2023

 

CLINICAL INDICATION: Female, 70 years old with history of R938 ENDO FLUID; Patient denies any other s
igns, symptoms, or relevant history 

 

TECHNIQUE:  Transvaginal (TV) and Transabdominal (TA) .  

Transabdominal grayscale sonographic images of the pelvis were acquired.  Transvaginal sonographic im
ages were medically necessary to better assess the following anatomy: Uterus, endometrium, ovaries

Doppler imaging: Not performed.

 

FINDINGS:

 

Date of LMP:  Unknown 

 

EXAM MEASUREMENTS:

 

Uterus:  2.7 x 1.5 x 2.7 cm

Endometrial Stripe: 0.2 cm

Right Ovary:  Not visualized due to bowel gas. cm

Left Ovary:  Not visualized due to bowel gas. cm

 

 

 

1. Uterus:  Anteverted   wnl

2. Endometrium:  Fluid within 

3. Right Ovary:  Obscured by overlying bowel gas

4. Left Ovary:  Obscured by overlying bowel gas

5. Bilateral Adnexa:  wnl

6. Posterior cul-de-sac:  wnl

 

Unremarkable anteverted appearance of the uterus. Endometrium is normal thickness with endometrial fl
uid identified. The fluid appears simple. The ovaries are obscured by overlying bowel gas. No free fl
uid.

 

IMPRESSION:

1.  Normal endometrial thickness with continued fluid within the endometrial canal. Could be seen wit
h cervical stenosis versus other etiologies.

2.  Nonvisualization of the ovaries due to overlying bowel gas.

 

[facility], Workstation: [id number], 1/29/2025 2:21 PM

## 2025-01-29 NOTE — BD
EXAMINATION TYPE: Axial Bone Density

 

DATE OF EXAM: 2025

 

CLINICAL HISTORY: 70 years old Female.  ICD-10 CODE: Z780 POST IVETTE STATUS , Additional History:

 

Height:  61.5 in

Weight:  137 lbs

 

FRAX RISK QUESTIONS:

History of Fracture in Adulthood: lt wrist age 63

 

RISK FACTORS 

 

HISTORY OF: 

History of Wrist Fracture: left age 63

Surgery to Wrist (left): age 63

 

 

MEDICATIONS: 

Thyroid Medications:  yes

Which medication: Levothyroxine

How Lon+ years

Osteoporosis Medications: not now

Which medication:  Fosamax

How Lon year

 

EXAM MEASUREMENTS: 

Bone mineral densitometry was performed using the [employer] System.

Bone mineral density as measured about the Lumbar spine is:  

----- L1-L4(G/cm2): 1.217

T Score Values are as follows:

----- L1: 2.0

----- L2: 0.3

----- L3: 0.1

----- L4: -0.9

----- L1-L4: 0.3

 

Z Score Values are as follows:

----- L1: 3.7

----- L2: 2.0

----- L3: 1.8

----- L4: 0.8

----- L1-L4: 2.1

 

Bone mineral density has: Increased 5.7% since study of: 2022

 

Bone mineral density about the R hip (g/cm2): 0.842

Bone mineral density about the L hip (g/cm2): 0.744

T Score values are as follows:

-----R Neck: -1.8

-----L Neck: -2.5

-----R Total: -1.3

-----L Total: -2.1

 

Z Score values are as follows:

-----R Neck: -0.1

-----L Neck: -0.8

-----R Total: 0.2

-----L Total: -0.6

 

Bone mineral density has: Decreased -5.6% since study of: 2022

 

FRAX%s: The graph provided illustrates a 23.0% chance for a major osteoporotic fx and a 5.6% chance f
or the hips probability for fx in 10 years time.

 

 

 

 

IMPRESSION:

Osteoporosis (T Score less than -2.5).

 

There is increased fracture risk and therapy is usually indicated based on age.

 

Re-Screen 1-2 years.

 

NOTE:  T-SCORE=SD OF THE YOUNG ADULT MEAN.

 

 

 

 

 

 

[facility], Workstation: [email or web address], 2025 4:25 PM